# Patient Record
Sex: MALE | Race: BLACK OR AFRICAN AMERICAN | NOT HISPANIC OR LATINO | Employment: UNEMPLOYED | URBAN - METROPOLITAN AREA
[De-identification: names, ages, dates, MRNs, and addresses within clinical notes are randomized per-mention and may not be internally consistent; named-entity substitution may affect disease eponyms.]

---
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
## 2023-05-31 ENCOUNTER — HOSPITAL ENCOUNTER (EMERGENCY)
Facility: HOSPITAL | Age: 23
Discharge: HOME | End: 2023-06-01
Attending: EMERGENCY MEDICINE
Payer: MEDICAID

## 2023-05-31 DIAGNOSIS — R07.9 CHEST PAIN: ICD-10-CM

## 2023-05-31 DIAGNOSIS — R50.9 FEVER, UNSPECIFIED FEVER CAUSE: ICD-10-CM

## 2023-05-31 DIAGNOSIS — R00.0 TACHYCARDIA: ICD-10-CM

## 2023-05-31 DIAGNOSIS — R42 LIGHTHEADED: ICD-10-CM

## 2023-05-31 DIAGNOSIS — E11.9 TYPE 2 DIABETES MELLITUS WITHOUT COMPLICATION, WITHOUT LONG-TERM CURRENT USE OF INSULIN: Primary | ICD-10-CM

## 2023-05-31 LAB
ALBUMIN SERPL BCP-MCNC: 4.4 G/DL (ref 3.5–5.2)
ALP SERPL-CCNC: 93 U/L (ref 55–135)
ALT SERPL W/O P-5'-P-CCNC: 76 U/L (ref 10–44)
ANION GAP SERPL CALC-SCNC: 16 MMOL/L (ref 8–16)
AST SERPL-CCNC: 33 U/L (ref 10–40)
BASOPHILS # BLD AUTO: 0.04 K/UL (ref 0–0.2)
BASOPHILS NFR BLD: 0.5 % (ref 0–1.9)
BILIRUB SERPL-MCNC: 0.7 MG/DL (ref 0.1–1)
BNP SERPL-MCNC: <10 PG/ML (ref 0–99)
BUN SERPL-MCNC: 7 MG/DL (ref 6–20)
CALCIUM SERPL-MCNC: 10.5 MG/DL (ref 8.7–10.5)
CHLORIDE SERPL-SCNC: 94 MMOL/L (ref 95–110)
CO2 SERPL-SCNC: 25 MMOL/L (ref 23–29)
CREAT SERPL-MCNC: 1.3 MG/DL (ref 0.5–1.4)
D DIMER PPP IA.FEU-MCNC: 0.28 MG/L FEU
DIFFERENTIAL METHOD: ABNORMAL
EOSINOPHIL # BLD AUTO: 0.1 K/UL (ref 0–0.5)
EOSINOPHIL NFR BLD: 1.2 % (ref 0–8)
ERYTHROCYTE [DISTWIDTH] IN BLOOD BY AUTOMATED COUNT: 12.4 % (ref 11.5–14.5)
EST. GFR  (NO RACE VARIABLE): >60 ML/MIN/1.73 M^2
GLUCOSE SERPL-MCNC: 351 MG/DL (ref 70–110)
HCT VFR BLD AUTO: 50.4 % (ref 40–54)
HGB BLD-MCNC: 16.6 G/DL (ref 14–18)
IMM GRANULOCYTES # BLD AUTO: 0.06 K/UL (ref 0–0.04)
IMM GRANULOCYTES NFR BLD AUTO: 0.7 % (ref 0–0.5)
INFLUENZA A, MOLECULAR: NEGATIVE
INFLUENZA B, MOLECULAR: NEGATIVE
LYMPHOCYTES # BLD AUTO: 1.6 K/UL (ref 1–4.8)
LYMPHOCYTES NFR BLD: 19.2 % (ref 18–48)
MCH RBC QN AUTO: 26.2 PG (ref 27–31)
MCHC RBC AUTO-ENTMCNC: 32.9 G/DL (ref 32–36)
MCV RBC AUTO: 80 FL (ref 82–98)
MONOCYTES # BLD AUTO: 0.7 K/UL (ref 0.3–1)
MONOCYTES NFR BLD: 9 % (ref 4–15)
NEUTROPHILS # BLD AUTO: 5.7 K/UL (ref 1.8–7.7)
NEUTROPHILS NFR BLD: 69.4 % (ref 38–73)
NRBC BLD-RTO: 0 /100 WBC
PLATELET # BLD AUTO: 234 K/UL (ref 150–450)
PMV BLD AUTO: 12.6 FL (ref 9.2–12.9)
POC CARDIAC TROPONIN I: 0.01 NG/ML (ref 0–0.08)
POCT GLUCOSE: 313 MG/DL (ref 70–110)
POCT GLUCOSE: 335 MG/DL (ref 70–110)
POTASSIUM SERPL-SCNC: 3.3 MMOL/L (ref 3.5–5.1)
PROT SERPL-MCNC: 8.4 G/DL (ref 6–8.4)
RBC # BLD AUTO: 6.34 M/UL (ref 4.6–6.2)
SAMPLE: NORMAL
SARS-COV-2 RDRP RESP QL NAA+PROBE: NEGATIVE
SODIUM SERPL-SCNC: 135 MMOL/L (ref 136–145)
SPECIMEN SOURCE: NORMAL
TROPONIN I SERPL DL<=0.01 NG/ML-MCNC: 0.01 NG/ML (ref 0–0.03)
WBC # BLD AUTO: 8.18 K/UL (ref 3.9–12.7)

## 2023-05-31 PROCEDURE — 80053 COMPREHEN METABOLIC PANEL: CPT | Performed by: STUDENT IN AN ORGANIZED HEALTH CARE EDUCATION/TRAINING PROGRAM

## 2023-05-31 PROCEDURE — 87502 INFLUENZA DNA AMP PROBE: CPT

## 2023-05-31 PROCEDURE — U0002 COVID-19 LAB TEST NON-CDC: HCPCS | Performed by: STUDENT IN AN ORGANIZED HEALTH CARE EDUCATION/TRAINING PROGRAM

## 2023-05-31 PROCEDURE — 63600175 PHARM REV CODE 636 W HCPCS: Performed by: STUDENT IN AN ORGANIZED HEALTH CARE EDUCATION/TRAINING PROGRAM

## 2023-05-31 PROCEDURE — 99284 PR EMERGENCY DEPT VISIT,LEVEL IV: ICD-10-PCS | Mod: ,,, | Performed by: EMERGENCY MEDICINE

## 2023-05-31 PROCEDURE — 25000003 PHARM REV CODE 250: Performed by: STUDENT IN AN ORGANIZED HEALTH CARE EDUCATION/TRAINING PROGRAM

## 2023-05-31 PROCEDURE — 85379 FIBRIN DEGRADATION QUANT: CPT | Performed by: STUDENT IN AN ORGANIZED HEALTH CARE EDUCATION/TRAINING PROGRAM

## 2023-05-31 PROCEDURE — 93010 EKG 12-LEAD: ICD-10-PCS | Mod: ,,, | Performed by: INTERNAL MEDICINE

## 2023-05-31 PROCEDURE — 99285 EMERGENCY DEPT VISIT HI MDM: CPT | Mod: 25

## 2023-05-31 PROCEDURE — 93010 EKG 12-LEAD: ICD-10-PCS | Mod: 77,,, | Performed by: INTERNAL MEDICINE

## 2023-05-31 PROCEDURE — 93010 ELECTROCARDIOGRAM REPORT: CPT | Mod: ,,, | Performed by: INTERNAL MEDICINE

## 2023-05-31 PROCEDURE — 83880 ASSAY OF NATRIURETIC PEPTIDE: CPT | Performed by: STUDENT IN AN ORGANIZED HEALTH CARE EDUCATION/TRAINING PROGRAM

## 2023-05-31 PROCEDURE — 85025 COMPLETE CBC W/AUTO DIFF WBC: CPT | Performed by: STUDENT IN AN ORGANIZED HEALTH CARE EDUCATION/TRAINING PROGRAM

## 2023-05-31 PROCEDURE — 82962 GLUCOSE BLOOD TEST: CPT

## 2023-05-31 PROCEDURE — 96361 HYDRATE IV INFUSION ADD-ON: CPT

## 2023-05-31 PROCEDURE — 96374 THER/PROPH/DIAG INJ IV PUSH: CPT

## 2023-05-31 PROCEDURE — 93010 ELECTROCARDIOGRAM REPORT: CPT | Mod: 77,,, | Performed by: INTERNAL MEDICINE

## 2023-05-31 PROCEDURE — 93005 ELECTROCARDIOGRAM TRACING: CPT

## 2023-05-31 PROCEDURE — 87502 INFLUENZA DNA AMP PROBE: CPT | Performed by: STUDENT IN AN ORGANIZED HEALTH CARE EDUCATION/TRAINING PROGRAM

## 2023-05-31 PROCEDURE — 84484 ASSAY OF TROPONIN QUANT: CPT | Performed by: STUDENT IN AN ORGANIZED HEALTH CARE EDUCATION/TRAINING PROGRAM

## 2023-05-31 PROCEDURE — 99284 EMERGENCY DEPT VISIT MOD MDM: CPT | Mod: ,,, | Performed by: EMERGENCY MEDICINE

## 2023-05-31 RX ORDER — ACETAMINOPHEN 500 MG
1000 TABLET ORAL
Status: COMPLETED | OUTPATIENT
Start: 2023-05-31 | End: 2023-05-31

## 2023-05-31 RX ORDER — METFORMIN HYDROCHLORIDE 500 MG/1
500 TABLET ORAL 2 TIMES DAILY WITH MEALS
Qty: 60 TABLET | Refills: 11 | Status: SHIPPED | OUTPATIENT
Start: 2023-05-31 | End: 2024-05-30

## 2023-05-31 RX ADMIN — SODIUM CHLORIDE 1000 ML: 9 INJECTION, SOLUTION INTRAVENOUS at 09:05

## 2023-05-31 RX ADMIN — SODIUM CHLORIDE, POTASSIUM CHLORIDE, SODIUM LACTATE AND CALCIUM CHLORIDE 1000 ML: 600; 310; 30; 20 INJECTION, SOLUTION INTRAVENOUS at 11:05

## 2023-05-31 RX ADMIN — INSULIN HUMAN 5 UNITS: 100 INJECTION, SOLUTION PARENTERAL at 11:05

## 2023-05-31 RX ADMIN — ACETAMINOPHEN 1000 MG: 500 TABLET ORAL at 09:05

## 2023-06-01 VITALS
WEIGHT: 312 LBS | HEIGHT: 71 IN | DIASTOLIC BLOOD PRESSURE: 79 MMHG | OXYGEN SATURATION: 98 % | HEART RATE: 96 BPM | RESPIRATION RATE: 18 BRPM | SYSTOLIC BLOOD PRESSURE: 163 MMHG | TEMPERATURE: 99 F | BODY MASS INDEX: 43.68 KG/M2

## 2023-06-01 LAB
BACTERIA #/AREA URNS AUTO: NORMAL /HPF
BILIRUB UR QL STRIP: NEGATIVE
CLARITY UR REFRACT.AUTO: CLEAR
COLOR UR AUTO: YELLOW
GLUCOSE SERPL-MCNC: 293 MG/DL (ref 70–110)
GLUCOSE UR QL STRIP: ABNORMAL
HGB UR QL STRIP: NEGATIVE
KETONES UR QL STRIP: ABNORMAL
LACTATE SERPL-SCNC: 1.4 MMOL/L (ref 0.5–2.2)
LEUKOCYTE ESTERASE UR QL STRIP: NEGATIVE
MICROSCOPIC COMMENT: NORMAL
NITRITE UR QL STRIP: NEGATIVE
PH UR STRIP: 6 [PH] (ref 5–8)
POCT GLUCOSE: 273 MG/DL (ref 70–110)
POCT GLUCOSE: 293 MG/DL (ref 70–110)
POCT GLUCOSE: 303 MG/DL (ref 70–110)
PROT UR QL STRIP: NEGATIVE
SP GR UR STRIP: 1.03 (ref 1–1.03)
URN SPEC COLLECT METH UR: ABNORMAL
YEAST UR QL AUTO: NORMAL

## 2023-06-01 PROCEDURE — 83605 ASSAY OF LACTIC ACID: CPT | Performed by: STUDENT IN AN ORGANIZED HEALTH CARE EDUCATION/TRAINING PROGRAM

## 2023-06-01 PROCEDURE — 87040 BLOOD CULTURE FOR BACTERIA: CPT | Performed by: STUDENT IN AN ORGANIZED HEALTH CARE EDUCATION/TRAINING PROGRAM

## 2023-06-01 PROCEDURE — 25000003 PHARM REV CODE 250: Performed by: STUDENT IN AN ORGANIZED HEALTH CARE EDUCATION/TRAINING PROGRAM

## 2023-06-01 PROCEDURE — 81001 URINALYSIS AUTO W/SCOPE: CPT | Performed by: EMERGENCY MEDICINE

## 2023-06-01 RX ORDER — IBUPROFEN 400 MG/1
800 TABLET ORAL
Status: COMPLETED | OUTPATIENT
Start: 2023-06-01 | End: 2023-06-01

## 2023-06-01 RX ADMIN — IBUPROFEN 800 MG: 400 TABLET, FILM COATED ORAL at 01:06

## 2023-06-01 NOTE — ED PROVIDER NOTES
Encounter Date: 5/31/2023       History     Chief Complaint   Patient presents with    Chest Pain     Midsternal CP describes as intermittent ache x 5 hours, denies sob/n/v. + dizziness     22-year-old male with PMH of hypertension presents with chest pain.  Patient reports that earlier today he had a short, substernal chest pain episode that lasted very short time.  He describes it as achy and was associated with lightheadedness.  He did not notice anything that made the chest pain better or worse.  Pain was nonradiating.  He denies any associated shortness of breath, headache, dizziness, abdominal pain, nausea, vomiting, dysuria, hematuria, diarrhea, constipation, black/bloody stools.  He denies any recent URI symptoms.  No known sick contacts.  Currently he does not have any chest pain but does have mild lightheadedness. Denies tobacco use, alcohol use, and recreational drug use.  No history of IV drug use.  Takes no daily medications.    The history is provided by the patient.   Review of patient's allergies indicates:  No Known Allergies  Past Medical History:   Diagnosis Date    Developmental delay     Hypertension      Past Surgical History:   Procedure Laterality Date    TONSILLECTOMY      TYMPANOSTOMY TUBE PLACEMENT       Family History   Problem Relation Age of Onset    Hypertension Maternal Grandmother      Social History     Tobacco Use    Smoking status: Never    Smokeless tobacco: Never     Review of Systems    Physical Exam     Initial Vitals [05/31/23 2016]   BP Pulse Resp Temp SpO2   (!) 160/107 (!) 135 18 (!) 100.8 °F (38.2 °C) 97 %      MAP       --         Physical Exam    Nursing note and vitals reviewed.  Constitutional: He is not diaphoretic. No distress.   Obese male resting in bed in no acute distress   HENT:   Head: Normocephalic and atraumatic.   Eyes: Conjunctivae and EOM are normal. Pupils are equal, round, and reactive to light.   Neck: Neck supple.   Normal range of  motion.  Cardiovascular:  Regular rhythm, normal heart sounds and intact distal pulses.           No murmur heard.  Tachycardic   Pulmonary/Chest: Breath sounds normal. No respiratory distress. He has no wheezes. He has no rhonchi. He has no rales.   No increased work of breathing or tachypnea.  Chest clear to auscultation bilaterally.  No chest wall tenderness to palpation.   Abdominal: Abdomen is soft. He exhibits no distension. There is no abdominal tenderness.   No tenderness to light or deep palpation diffusely There is no rebound and no guarding.   Musculoskeletal:         General: No tenderness or edema.      Cervical back: Normal range of motion and neck supple.     Neurological: He is alert and oriented to person, place, and time. He has normal strength. No sensory deficit. GCS score is 15. GCS eye subscore is 4. GCS verbal subscore is 5. GCS motor subscore is 6.   Skin: Skin is warm and dry. Capillary refill takes less than 2 seconds.       ED Course   Procedures  Labs Reviewed   CBC W/ AUTO DIFFERENTIAL - Abnormal; Notable for the following components:       Result Value    RBC 6.34 (*)     MCV 80 (*)     MCH 26.2 (*)     Immature Granulocytes 0.7 (*)     Immature Grans (Abs) 0.06 (*)     All other components within normal limits   COMPREHENSIVE METABOLIC PANEL - Abnormal; Notable for the following components:    Sodium 135 (*)     Potassium 3.3 (*)     Chloride 94 (*)     Glucose 351 (*)     ALT 76 (*)     All other components within normal limits   INFLUENZA A & B BY MOLECULAR   TROPONIN I   B-TYPE NATRIURETIC PEPTIDE   D DIMER, QUANTITATIVE   SARS-COV-2 RNA AMPLIFICATION, QUAL   TROPONIN ISTAT   POCT TROPONIN   POCT GLUCOSE MONITORING CONTINUOUS     EKG Readings: (Independently Interpreted)   Sinus tachycardia at a rate of 134 with PACs, no STEMI     Imaging Results              X-Ray Chest AP Portable (In process)  Result time 05/31/23 22:17:19                     Medications   sodium chloride 0.9%  bolus 1,000 mL 1,000 mL (1,000 mLs Intravenous New Bag 5/31/23 2135)   acetaminophen tablet 1,000 mg (1,000 mg Oral Given 5/31/23 2132)     Medical Decision Making:   Initial Assessment:   Febrile, tachycardic 23yo male presents following an episode of chest aching and lightheadedness  Differential Diagnosis:   ACS, myocarditis, pericarditis, covid, viral syndrome, pneumonia, dysrhythmia, electrolyte abnormality  Clinical Tests:   Lab Tests: Ordered and Reviewed  Radiological Study: Ordered and Reviewed  Medical Tests: Ordered and Reviewed  ED Management:  Will give Tylenol and 1 L normal saline.  See ED course for additional information.          Attending Attestation:   Physician Attestation Statement for Resident:  As the supervising MD   Physician Attestation Statement: I have personally seen and examined this patient.   I agree with the above history.  -:   As the supervising MD I agree with the above PE.     As the supervising MD I agree with the above treatment, course, plan, and disposition.                  ED Course as of 05/31/23 2218   Wed May 31, 2023   2115 Temp(!): 100.8 °F (38.2 °C)  Will give Tylenol [BD]   2148 CBC auto differential(!)  CBC unremarkable without leukocytosis, significant anemia, or decreased platelets   [BD]   2148 D-Dimer: 0.28  PE unlikely given normal dimer. Will not obtain CTA [BD]   2217 Influenza A, Molecular: Negative [BD]   2217 Comprehensive metabolic panel(!)  CMP remarkable for hyperglycemia of 351.  Patient has no reported history of diabetes.  Will recheck glucose after 1 L normal saline and start the patient on metformin [BD]   2217 Pt care handed off to oncoming team, pending repeat glucose and CXR.  [BD]      ED Course User Index  [BD] Nguyễn Doyle MD                   Clinical Impression:   Final diagnoses:  [R07.9] Chest pain  [R42] Lightheaded (Primary)  [R50.9] Fever, unspecified fever cause  [R00.0] Tachycardia                  Nguyễn Doyle,  MD  Resident  05/31/23 5313       Blu Lamar MD  06/01/23 8342

## 2023-06-01 NOTE — ED TRIAGE NOTES
Sunil Madison, a 22 y.o. male presents to the ED w/ complaint of chest pain that started roughly around 3 or 4pm today. Pt states it feels like an aching at 6/10, denies pain radiating. Pt denies SOB, N/V/D. Pt states the chest pain has happened before but comes and goes from past times it has happened.    Triage note:  Chief Complaint   Patient presents with    Chest Pain     Midsternal CP describes as intermittent ache x 5 hours, denies sob/n/v. + dizziness     Review of patient's allergies indicates:  No Known Allergies  Past Medical History:   Diagnosis Date    Developmental delay     Hypertension

## 2023-06-01 NOTE — PROVIDER PROGRESS NOTES - EMERGENCY DEPT.
"Encounter Date: 5/31/2023    ED Physician Progress Notes        Physician Note:   ED Resident HAND-OFF NOTE:  11:00 PM 5/31/2023  Sunil Madison is a 22 y.o. male who presented to the ED on 6/1/2023 and has been managed by Dr. Linares, who reports patient C/O chest pain. I assumed care of patient from off-going ED physician team at 11:00 PM pending CXR and improved glucose control.    On my evaluation, Sunil Madison appears well, hemodynamically stable and in NAD. Thus far, Sunil Madison has received:  Medications  acetaminophen tablet 1,000 mg (1,000 mg Oral Given 5/31/23 2132)  sodium chloride 0.9% bolus 1,000 mL 1,000 mL (0 mLs Intravenous Stopped 5/31/23 2242)  lactated ringers bolus 1,000 mL (0 mLs Intravenous Stopped 6/1/23 0056)  insulin regular injection 5 Units 0.05 mL (5 Units Intravenous Given 5/31/23 2349)    On my exam, I appreciate:  /76   Pulse 110   Temp (!) 100.6 °F (38.1 °C) (Oral)   Resp 12   Ht 5' 11" (1.803 m)   Wt (!) 141.5 kg (312 lb)   SpO2 98%   BMI 43.52 kg/m²   Constitutional: Well-appearing; Well-Nourished; Non-Toxic-appearing and in NAD.  Head/Face: AT/NC & No Facial asymmetry.  Oropharynx: Speaking Full Sentences with No drooling or slurring of speech.  Cardiovascular: Reg Rate; Reg Rhythm; No Murmurs.  Pulmonary/Chest: AT Thorax with Lungs CTA B/L.  Abdominal: Soft, ND, NT.  Musculoskeletal: FROM, NML Gait.  Neuro/Psych: Calm; Cooperative, Following Command, Answering Questions Appropriately, and No Gait/Balance deficits.    I also evaluated patient at bedside.  He is very well-appearing.  Unclear etiology for fever.  Blood cultures and lactate were added on for tachycardia to proportion to fever.  Did consider PE, however D-dimer ordered by previous team was negative.  UA negative as well.  Discussed strict ED return precautions with patient, and he expressed understanding.  He was discharged on metformin prescription, written by prior ED " physician.    Disposition: I anticipate patient will be discharged.  I have discussed and counseled Sunil Madison regarding exam, results, diagnosis, treatment, and plan.  ______________________  Leroy Kline DO   Emergency Medicine Resident  12:59 AM 6/1/2023

## 2023-06-01 NOTE — DISCHARGE INSTRUCTIONS
It was a pleasure taking care of you today!     Diagnosis: Diabetes    Home Care:   - Take Metformin twice daily for your diabetes    Follow-Up Plan:  - Follow-up with primary care doctor within 3 - 5 days to discuss your recent ER visit and any additional concerns that you may have.  - Additional testing and/or evaluation as directed by your primary doctor    Return to the Emergency Department for symptoms including but not limited to: persistence or worsening of symptoms, shortness of breath or chest pain, inability to drink without vomiting, passing out/fainting/ loss of consciousness, or if you have other concerns.

## 2023-06-06 LAB
BACTERIA BLD CULT: NORMAL
BACTERIA BLD CULT: NORMAL